# Patient Record
Sex: FEMALE | Race: OTHER | Employment: UNEMPLOYED | URBAN - METROPOLITAN AREA
[De-identification: names, ages, dates, MRNs, and addresses within clinical notes are randomized per-mention and may not be internally consistent; named-entity substitution may affect disease eponyms.]

---

## 2017-04-11 ENCOUNTER — OFFICE VISIT (OUTPATIENT)
Dept: FAMILY MEDICINE CLINIC | Facility: CLINIC | Age: 57
End: 2017-04-11

## 2017-04-11 VITALS
DIASTOLIC BLOOD PRESSURE: 74 MMHG | SYSTOLIC BLOOD PRESSURE: 136 MMHG | HEART RATE: 67 BPM | TEMPERATURE: 98 F | BODY MASS INDEX: 25 KG/M2 | WEIGHT: 131 LBS

## 2017-04-11 DIAGNOSIS — R51.9 FACIAL PAIN: ICD-10-CM

## 2017-04-11 DIAGNOSIS — Z00.00 PHYSICAL EXAM, ROUTINE: Primary | ICD-10-CM

## 2017-04-11 PROCEDURE — 97810 ACUP 1/> WO ESTIM 1ST 15 MIN: CPT | Performed by: FAMILY MEDICINE

## 2017-04-11 PROCEDURE — 99396 PREV VISIT EST AGE 40-64: CPT | Performed by: FAMILY MEDICINE

## 2017-04-11 PROCEDURE — 99212 OFFICE O/P EST SF 10 MIN: CPT | Performed by: FAMILY MEDICINE

## 2017-04-11 RX ORDER — CEPHALEXIN 500 MG/1
500 CAPSULE ORAL 3 TIMES DAILY
Qty: 21 CAPSULE | Refills: 0 | Status: SHIPPED | OUTPATIENT
Start: 2017-04-11 | End: 2017-08-31 | Stop reason: ALTCHOICE

## 2017-04-11 RX ORDER — MOMETASONE FUROATE 1 MG/G
CREAM TOPICAL
Qty: 60 G | Refills: 1 | Status: SHIPPED | OUTPATIENT
Start: 2017-04-11 | End: 2017-08-31 | Stop reason: ALTCHOICE

## 2017-04-11 RX ORDER — PAROXETINE HYDROCHLORIDE 20 MG/1
20 TABLET, FILM COATED ORAL EVERY MORNING
Qty: 90 TABLET | Refills: 1 | Status: SHIPPED | OUTPATIENT
Start: 2017-04-11 | End: 2017-08-31

## 2017-04-13 NOTE — PROGRESS NOTES
HPI:    Patient ID: Greta Brink is a 61year old female. HPI Comments: Doing well. Anxiety/depression controlled needs refill on paxil. Gyn Exam  Associated symptoms include fatigue.  Pertinent negatives include no chest pain, chills, coughing, diaph right inguinal area and confirmed negative in the left inguinal area. Genitourinary: Vagina normal and uterus normal. No breast swelling, tenderness or discharge. Uterus is not deviated, not enlarged, not fixed and not tender.  Cervix exhibits no motion t

## 2017-05-05 ENCOUNTER — LAB ENCOUNTER (OUTPATIENT)
Dept: LAB | Age: 57
End: 2017-05-05
Attending: FAMILY MEDICINE
Payer: COMMERCIAL

## 2017-05-05 DIAGNOSIS — Z00.00 PHYSICAL EXAM, ROUTINE: ICD-10-CM

## 2017-05-05 PROCEDURE — 80061 LIPID PANEL: CPT

## 2017-05-05 PROCEDURE — 85025 COMPLETE CBC W/AUTO DIFF WBC: CPT

## 2017-05-05 PROCEDURE — 80053 COMPREHEN METABOLIC PANEL: CPT

## 2017-05-05 PROCEDURE — 36415 COLL VENOUS BLD VENIPUNCTURE: CPT

## 2017-05-05 PROCEDURE — 84443 ASSAY THYROID STIM HORMONE: CPT

## 2017-05-23 ENCOUNTER — TELEPHONE (OUTPATIENT)
Dept: FAMILY MEDICINE CLINIC | Facility: CLINIC | Age: 57
End: 2017-05-23

## 2017-05-23 NOTE — TELEPHONE ENCOUNTER
Pt unable to come to appointment because insurance problems pt would like a call back regarding her lab results ([place]an speaking)

## 2017-07-17 NOTE — TELEPHONE ENCOUNTER
This patient needs an apt. Her Thyroid test was a bit abnormal as well as her blood sugar.  Please call her to schedule an apt

## 2017-08-02 ENCOUNTER — TELEPHONE (OUTPATIENT)
Dept: FAMILY MEDICINE CLINIC | Facility: CLINIC | Age: 57
End: 2017-08-02

## 2017-08-02 NOTE — TELEPHONE ENCOUNTER
Pt is out of meds pt insurance had changed there for hasn't been able to make appointment with dr Vijay Francisco patient asking if can refill for 1 month     Current Outpatient Prescriptions:   •  PARoxetine HCl 20 MG Oral Tab, Take 1 tablet (20 mg total) by mouth

## 2017-08-02 NOTE — TELEPHONE ENCOUNTER
lmtcb for pt if she calls back inform her of my message below. Called [facility] in [place] pt has refills on file.  They will get medication ready for hershadys  Refill Protocol Appointment Criteria  · Appointment scheduled in the past 6 months or in the

## 2017-09-06 PROBLEM — Z00.00 ROUTINE MEDICAL EXAM: Status: ACTIVE | Noted: 2017-09-06

## 2017-09-06 PROBLEM — Z12.39 BREAST CANCER SCREENING: Status: ACTIVE | Noted: 2017-09-06

## 2017-09-06 PROBLEM — Z12.11 COLON CANCER SCREENING: Status: ACTIVE | Noted: 2017-09-06

## 2017-09-06 PROBLEM — F32.A DEPRESSION, UNSPECIFIED DEPRESSION TYPE: Status: ACTIVE | Noted: 2017-09-06

## 2017-09-06 PROBLEM — L72.9 SCALP CYST: Status: ACTIVE | Noted: 2017-09-06

## 2017-09-26 PROCEDURE — 87086 URINE CULTURE/COLONY COUNT: CPT | Performed by: EMERGENCY MEDICINE

## 2017-10-14 PROCEDURE — 81003 URINALYSIS AUTO W/O SCOPE: CPT | Performed by: INTERNAL MEDICINE

## 2018-11-28 PROCEDURE — 81001 URINALYSIS AUTO W/SCOPE: CPT | Performed by: INTERNAL MEDICINE

## 2018-12-17 PROBLEM — L72.9 SCALP CYST: Status: RESOLVED | Noted: 2017-09-06 | Resolved: 2018-12-17

## 2018-12-17 PROBLEM — M79.602 BILATERAL ARM PAIN: Status: ACTIVE | Noted: 2018-12-17

## 2018-12-17 PROBLEM — R11.0 NAUSEA: Status: ACTIVE | Noted: 2018-12-17

## 2018-12-17 PROBLEM — B35.1 TOENAIL FUNGUS: Status: ACTIVE | Noted: 2018-12-17

## 2018-12-17 PROBLEM — M79.601 BILATERAL ARM PAIN: Status: ACTIVE | Noted: 2018-12-17

## 2020-12-05 PROBLEM — M79.602 BILATERAL ARM PAIN: Status: RESOLVED | Noted: 2018-12-17 | Resolved: 2020-12-05

## 2020-12-05 PROBLEM — R11.0 NAUSEA: Status: RESOLVED | Noted: 2018-12-17 | Resolved: 2020-12-05

## 2020-12-05 PROBLEM — Z12.11 COLON CANCER SCREENING: Status: RESOLVED | Noted: 2017-09-06 | Resolved: 2020-12-05

## 2020-12-05 PROBLEM — B35.1 TOENAIL FUNGUS: Status: RESOLVED | Noted: 2018-12-17 | Resolved: 2020-12-05

## 2020-12-05 PROBLEM — M79.601 BILATERAL ARM PAIN: Status: RESOLVED | Noted: 2018-12-17 | Resolved: 2020-12-05

## (undated) NOTE — Clinical Note
4/15/2017              Zoey Sun        [address]        [place]         Dear Yumi Prakash,    It was a pleasure to see you. Your PAP test was normal.  There is no need for further testing at this time.   I look forward to seeing you a

## (undated) NOTE — Clinical Note
4/13/2017              Zoey Sun        [address]        [place]         Dear Jessica Mcgill,    It was a pleasure to see you. Your PAP test was normal.  There is no need for further testing at this time.   I look forward to seeing you a

## (undated) NOTE — Clinical Note
AUTHORIZATION FOR SURGICAL OPERATION OR OTHER PROCEDURE    1.  I hereby authorize Dr. Moira Crawford, and [facility] staff assigned to my case to perform the following operation and/or procedure at the [facility]:    accupuncture  ______________ Time:  ________ A. M.  P.M.        Patient Name:  ______________________________________________________  (please print)      Patient signature:  ___________________________________________________             Relationship to Patient:

## (undated) NOTE — MR AVS SNAPSHOT
[facility] [address] [id number]  [phone]               Thank you for choosing us for your health care visit with Darlean Dance, MD.  We are glad to serve you and happy to provide you with this summary of yo [place], [place]    It is the patient's responsibility to check with and follow their insurance company's guidelines for prior authorization for this test.  You may be held responsible for payment in full if proper authorization is not acquired.   Please contac medical emergencies, dial 911. Visit [email or web address] to learn more.            Visit [facility] online at  [email or web address]